# Patient Record
Sex: MALE | Race: BLACK OR AFRICAN AMERICAN | URBAN - METROPOLITAN AREA
[De-identification: names, ages, dates, MRNs, and addresses within clinical notes are randomized per-mention and may not be internally consistent; named-entity substitution may affect disease eponyms.]

---

## 2022-04-18 ENCOUNTER — EMERGENCY (EMERGENCY)
Facility: HOSPITAL | Age: 42
LOS: 0 days | Discharge: HOME | End: 2022-04-18
Attending: STUDENT IN AN ORGANIZED HEALTH CARE EDUCATION/TRAINING PROGRAM | Admitting: STUDENT IN AN ORGANIZED HEALTH CARE EDUCATION/TRAINING PROGRAM
Payer: MEDICAID

## 2022-04-18 VITALS
DIASTOLIC BLOOD PRESSURE: 74 MMHG | HEART RATE: 97 BPM | HEIGHT: 72 IN | OXYGEN SATURATION: 96 % | RESPIRATION RATE: 20 BRPM | TEMPERATURE: 100 F | SYSTOLIC BLOOD PRESSURE: 131 MMHG | WEIGHT: 158.07 LBS

## 2022-04-18 DIAGNOSIS — L02.214 CUTANEOUS ABSCESS OF GROIN: ICD-10-CM

## 2022-04-18 DIAGNOSIS — R59.9 ENLARGED LYMPH NODES, UNSPECIFIED: ICD-10-CM

## 2022-04-18 DIAGNOSIS — R10.9 UNSPECIFIED ABDOMINAL PAIN: ICD-10-CM

## 2022-04-18 DIAGNOSIS — R50.9 FEVER, UNSPECIFIED: ICD-10-CM

## 2022-04-18 PROCEDURE — 99284 EMERGENCY DEPT VISIT MOD MDM: CPT | Mod: 25

## 2022-04-18 PROCEDURE — 10060 I&D ABSCESS SIMPLE/SINGLE: CPT

## 2022-04-18 RX ORDER — IBUPROFEN 200 MG
600 TABLET ORAL ONCE
Refills: 0 | Status: COMPLETED | OUTPATIENT
Start: 2022-04-18 | End: 2022-04-18

## 2022-04-18 RX ADMIN — Medication 600 MILLIGRAM(S): at 17:47

## 2022-04-18 NOTE — ED PROVIDER NOTE - PHYSICAL EXAMINATION
CONSTITUTIONAL: Well-developed; well-nourished; in no acute distress.   SKIN: warm, dry  HEAD: Normocephalic; atraumatic.  EYES: PERRL, EOMI, normal sclera and conjunctiva   ENT: No nasal discharge; airway clear.  NECK: Supple; non tender.  CARD:  Regular rate and rhythm.   RESP: NO inc WOB   ABD: soft ntnd,, 3cm by 2 cm erythematous fluctuant mass on right groin, .5cm by .5cm mass on left groin, nonerythematous  EXT: Normal ROM.    LYMPH: No acute cervical adenopathy.  NEURO: Alert, oriented, grossly unremarkable  PSYCH: Cooperative, appropriate.

## 2022-04-18 NOTE — ED PROVIDER NOTE - OBJECTIVE STATEMENT
41 year old male with no phmx comes in for groin pain. pt was seen at [facility] 1 day ago and was told that he had lymphdadenopathy. pt however was not satisfied with the care and came here. Here, pt is complaining of bilateral 41 year old male with no phmx comes in for groin pain. pt was seen at [facility] 1 day ago and was told that he had lymphdadenopathy. pt however was not satisfied with the care and came here. Here, pt is complaining of bilateral groin pain. pt denies fevers, chills, nausea, vomiting, chest pain, sob, abd pain.

## 2022-04-18 NOTE — ED PROVIDER NOTE - NSFOLLOWUPINSTRUCTIONS_ED_ALL_ED_FT
Abscess    COME IN 4-5 DAYS FOR WOUND CHECK    An abscess is an infected area that contains a collection of pus and debris. It can occur in almost any part of the body and occurs when the tissue gets infection. Symptoms include a painful mass that is red, warm, tender that might break open and have drainage. You received an incision and drainage procedure, and the fluid grew a bacteria called pseudomonas. You were given antibiotics to take for 10 days after leaving the hospital called cephalexin . Continue taking this medication 3 times a day until it is complete. You were sent home with wound packing instructions and a suture removal kit, so you can use the forceps to pack gauze into the wound and cover it. This needs to be done everyday. Follow up with your pcp for further management.     SEEK MEDICAL CARE IF YOU HAVE THE FOLLOWING SYMPTOMS: chills, fever, muscle aches, or red streaking from the area.

## 2022-04-18 NOTE — ED ADULT NURSE NOTE - CAS DISCH ACCOMP BY
Self Home Suture Removal Text: Patient was provided instructions on removing sutures and will remove their sutures at home.  If they have any questions or difficulties they will call the office.

## 2022-04-18 NOTE — ED PROVIDER NOTE - PATIENT PORTAL LINK FT
You can access the FollowMyHealth Patient Portal offered by [facility] by registering at the following website: [email or web address]. By joining [facility]’s FollowMyHealth portal, you will also be able to view your health information using other applications (apps) compatible with our system.

## 2022-04-18 NOTE — ED PROVIDER NOTE - ATTENDING CONTRIBUTION TO CARE
42 yo m no pmh  pt presents for eval of 1 week of groin swelling. + fever today.  no dysuria/hematuria. no testicular pain/swelling    vss  gen- NAD, aaox3  card-rrr  lungs-ctab, no wheezing or rhonchi  abd-sntnd, no guarding or rebound  - L inguinal region w/ erythema, edema, tenderness, no scrotal swelling/erythema  neuro- full str/sensation, cn ii-xii grossly intact, normal coordination 42 yo m no pmh  pt presents for eval of 1 week of groin swelling. + fever today at triage.  no dysuria/hematuria. no testicular pain/swelling. pt shaves suprapubic region    vss  gen- NAD, aaox3  card-rrr  lungs-ctab, no wheezing or rhonchi  abd-sntnd, no guarding or rebound  - L inguinal region w/ erythema, edema, tenderness, no scrotal swelling/erythema  neuro- full str/sensation, cn ii-xii grossly intact, normal coordination

## 2023-01-24 ENCOUNTER — EMERGENCY (EMERGENCY)
Facility: HOSPITAL | Age: 43
LOS: 0 days | Discharge: HOME | End: 2023-01-24
Attending: STUDENT IN AN ORGANIZED HEALTH CARE EDUCATION/TRAINING PROGRAM | Admitting: STUDENT IN AN ORGANIZED HEALTH CARE EDUCATION/TRAINING PROGRAM
Payer: MEDICAID

## 2023-01-24 VITALS
OXYGEN SATURATION: 99 % | WEIGHT: 164.02 LBS | TEMPERATURE: 98 F | HEART RATE: 72 BPM | DIASTOLIC BLOOD PRESSURE: 73 MMHG | SYSTOLIC BLOOD PRESSURE: 136 MMHG | HEIGHT: 72 IN | RESPIRATION RATE: 16 BRPM

## 2023-01-24 DIAGNOSIS — G56.22 LESION OF ULNAR NERVE, LEFT UPPER LIMB: ICD-10-CM

## 2023-01-24 DIAGNOSIS — M79.642 PAIN IN LEFT HAND: ICD-10-CM

## 2023-01-24 PROCEDURE — 99284 EMERGENCY DEPT VISIT MOD MDM: CPT

## 2023-01-24 NOTE — ED PROVIDER NOTE - PATIENT PORTAL LINK FT
You can access the FollowMyHealth Patient Portal offered by [facility] by registering at the following website: [email or web address]. By joining [facility]’s FollowMyHealth portal, you will also be able to view your health information using other applications (apps) compatible with our system.

## 2023-01-24 NOTE — ED PROVIDER NOTE - NSFOLLOWUPINSTRUCTIONS_ED_ALL_ED_FT
Wear a volar wrist splint for comfort. Follow up with physical therapy.      Guyon Tunnel Syndrome    Palm view of the parts of a hand showing the ulnar nerve and Guyon tunnel.   Guyon tunnel syndrome, also known as cyclist's palsy, is a disorder that causes pain, weakness, and numbness in the wrist and the hand. Pain occurs in the outer (ulnar) part of the wrist and the palm of the hand, including the ring finger and pinkie. This condition happens when a nerve in the arm and hand (ulnar nerve) is stretched or squeezed (compressed) at the base of the hand.    Over time, you may start to have symptoms with just a small amount of stress to your hand or wrist. When it takes less stress to cause symptoms than it used to, this is called sensitization.      What are the causes?    This condition may be caused by:  •Repetitive pressure on the hands and wrists.  •An injury to the base of the hand that causes swelling or a break (fracture) in a wrist bone.    Guyon tunnel syndrome is common among cyclists because gripping and leaning on bicycle handlebars for long periods of time can cause this condition and make it worse over time.      What increases the risk?    The following factors may make you more likely to develop this condition:  •Having diabetes.  •Having hypothyroidism.  •Participating in activities that involve repeated impact, pressure, or vibration of the hands or wrists. These include certain sports, such as cycling, tennis, and martial arts.  •Repeatedly bearing weight in your hands, such as when you use crutches.  •Having gout or rheumatoid arthritis.  •Having a ganglion cyst or lipoma near the base of the hand.  •Having carpal tunnel syndrome.    What are the signs or symptoms?    Symptoms of this condition may include:  •Tingling, numbness, or a burning feeling in the ulnar side of the palm and in the ring finger and pinkie.  •Pain in the hand or wrist. Pain may feel sharp, or it may feel like an ache.  •Weakness in the hand, which may cause difficulty gripping objects.  •Involuntary bending of the ring finger and pinkie toward the palm (claw hand).    How is this diagnosed?    This condition may be diagnosed based on:  •A physical exam.  •Your medical history.    •Tests, such as:  •Nerve conduction test. This test checks the quality of signals along your ulnar nerve.  •X-rays.  •A CT scan.  •An ultrasound. This uses sound waves to make an image of your affected area.      How is this treated?    Treatment for this condition may include:  •Resting the injured area. This may include stopping or modifying any sports and physical activity for a period of time.  •Icing the injured area.  •Taking medicines that help to relieve pain and inflammation.  •Wearing a splint to keep your wrist and hand in the proper position.  •Having physical therapy.  •Having an injection of medicine (cortisone) that helps to reduce inflammation.  •Having surgery to relieve pressure on the ulnar nerve. This is done only in very severe cases.    Follow these instructions at home:    If you have a splint:   • Do not put pressure on any part of the splint until it is fully hardened. This may take several hours.  •Wear the splint as told by your health care provider. Remove it only as told by your health care provider.  •Loosen the splint if your fingers tingle, become numb, or turn cold and blue.  •Keep the splint clean.  •If the splint is not waterproof:  •Do not let it get wet.  •Cover it with a watertight covering when you take a bath or shower.  •Ask your health care provider when it is safe for you to drive if you have a splint on your hand.    Managing pain, stiffness, and swelling   Bag of ice on a towel on the skin. •If directed, put ice on the injured area.  •Put ice in a plastic bag.  •Place a towel between your skin and the bag.  •Leave the ice on for 20 minutes, 2–3 times a day.    Activity     •Return to your normal activities as told by your health care provider. Ask your health care provider what activities are safe for you.  •When you do activities that cause stress to your hands and wrists, try wearing padded gloves. Change your hand positions often, especially when you do these activities for a long time.  •If physical therapy was prescribed, do exercises as told by your health care provider.  General instructions     •Take over-the-counter and prescription medicines only as told by your health care provider.  •Keep all follow-up visits as told by your health care provider. This is important.    Contact a health care provider if:  •You have pain, tingling, or weakness that gets worse.  •Your symptoms do not improve after 2 weeks of treatment.        Summary    •Guyon tunnel syndrome, also known as cyclist's palsy, is a disorder that causes pain, weakness, and numbness in the wrist and the hand.  •This condition is common among cyclists because gripping and leaning on bicycle handlebars for long periods of time can cause this condition and make it worse over time.  •This condition is treated with rest, ice, medicines, physical therapy, and surgery as needed. You may also need to wear a splint.    This information is not intended to replace advice given to you by your health care provider. Make sure you discuss any questions you have with your health care provider.

## 2023-01-24 NOTE — ED PROVIDER NOTE - NSFOLLOWUPCLINICS_GEN_ALL_ED_FT
[facility] ([facility])  Rehabilitation  [facility] [address] [place] [address]  Phone: [phone]  Fax:

## 2023-01-24 NOTE — ED PROVIDER NOTE - PHYSICAL EXAMINATION
PHYSICAL EXAM:    GENERAL: NAD, well-groomed, well-developed  HEAD:  Atraumatic, Normocephalic  EYES: EOMI, PERRLA, conjunctiva and sclera clear  ENMT: No tonsillar erythema, exudates, or enlargement; Moist mucous membranes  NECK: Supple,   HEART: Regular rate and rhythm; No murmurs, rubs, or gallops  RESPIRATORY: CTA B/L, No W/R/R  ABDOMEN: Soft, Nontender, Nondistended; Bowel sounds present  NEUROLOGY: A&Ox3, nonfocal, moving all extremities  EXTREMITIES:  2+ Peripheral Pulses, No clubbing, cyanosis, or edema, +phalen and tinnel sign in the left hand  SKIN: warm, dry, normal color, no rash or abnormal lesions

## 2023-01-24 NOTE — ED PROVIDER NOTE - ATTENDING CONTRIBUTION TO CARE
43 yo f denies pmh  pt presents for eval lf L hand 4th/5th digit tingling. sx for several days. 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  no acute trauma.  no weakness. no heavy lifting/forearm/elbow pain.  pt states the R hand intermittently gets similar 4th/5th digit tingling but it is not present now.    vss  gen- NAD, aaox3  card-rrr  lungs-ctab, no wheezing or rhonchi  neuro- UE/LE str intact  L hand- paresthesia to L 5th and half of 4th digit, sensation otherwise intact, str intact MUR, full forearm sensation/str intact at wrist/elbow, radial pulse 2+  R hand- full str/sensation to MUR distrubutionfull forearm sensation/str intact at wrist/elbow, radial pulse 2+

## 2023-01-24 NOTE — ED PROVIDER NOTE - OBJECTIVE STATEMENT
41 yo M unremarkable PMHx p/t the ED for left hand pain over the past month that had progressively gotten worse. The patient states that he is unable to move his hand without feeling sharp pain radiating across his palm. States that he has tried ibuprofen in the past to relieve the pain however it has continued to get worse. Denies any trauma, paresthesias in the opposite hand, changes in skin color, temperature changes in the left hand, arthralgias in any other joints.

## 2023-01-24 NOTE — ED ADULT NURSE NOTE - EXTENSIONS OF SELF_ADULT
Same-Day Surgery   Adult Discharge Orders & Instructions     For 24 hours after surgery:  1. Get plenty of rest.  A responsible adult must stay with you for at least 24 hours after you leave the hospital.   2. Pain medication can slow your reflexes. Do not drive or use heavy equipment.  If you have weakness or tingling, don't drive or use heavy equipment until this feeling goes away.  3. Mixing alcohol and pain medication can cause dizziness and slow your breathing. It can even be fatal. Do not drink alcohol while taking pain medication.  4. Avoid strenuous or risky activities.  Ask for help when climbing stairs.   5. You may feel lightheaded.  If so, sit for a few minutes before standing.  Have someone help you get up.   6. If you have nausea (feel sick to your stomach), drink only clear liquids such as apple juice, ginger ale, broth or 7-Up.  Rest may also help.  Be sure to drink enough fluids.  Move to a regular diet as you feel able. Take pain medications with a small amount of solid food, such as toast or crackers, to avoid nausea.   7. A slight fever is normal. Call the doctor if your fever is over 100 F (37.7 C) (taken under the tongue) or lasts longer than 24 hours.  8. You may have a dry mouth, muscle aches, trouble sleeping or a sore throat.  These symptoms should go away after 24 hours.  9. Do not make important or legal decisions.   Pain Management:      1. Take pain medication (if prescribed) for pain as directed by your physician.        2. WARNING: If the pain medication you have been prescribed contains Tylenol  (acetaminophen), DO NOT take additional doses of Tylenol (acetaminophen).     Call your doctor for any of the followin.  Signs of infection (fever, growing tenderness at the surgery site, severe pain, a large amount of drainage or bleeding, foul-smelling drainage, redness, swelling).    2.  It has been over 8 to 10 hours since surgery and you are still not able to urinate (pee).    3.   Headache for over 24 hours.    4.  Numbness, tingling or weakness the day after surgery (if you had spinal anesthesia).  To contact a doctor, call _____________________________________ or:      [phone] and ask for the Resident On Call for:          __________________________________________ (answered 24 hours a day)      Emergency Department:  [facility] [facility]: [phone]  [place]: [phone]                   None

## 2024-02-09 NOTE — ED PROVIDER NOTE - DISPOSITION TYPE
DISCHARGE
Body Location Override (Optional - Billing Will Still Be Based On Selected Body Map Location If Applicable): left posterior thigh
Detail Level: Simple
Size Of Lesion In Cm (Optional): 0.4
X Size Of Lesion In Cm (Optional): 0
Morphology Per Location (Optional): Brown macule